# Patient Record
Sex: MALE
[De-identification: names, ages, dates, MRNs, and addresses within clinical notes are randomized per-mention and may not be internally consistent; named-entity substitution may affect disease eponyms.]

---

## 2023-06-27 ENCOUNTER — APPOINTMENT (OUTPATIENT)
Dept: NEUROSURGERY | Facility: CLINIC | Age: 83
End: 2023-06-27
Payer: MEDICARE

## 2023-06-27 PROBLEM — Z00.00 ENCOUNTER FOR PREVENTIVE HEALTH EXAMINATION: Status: ACTIVE | Noted: 2023-06-27

## 2023-06-27 PROCEDURE — 99205 OFFICE O/P NEW HI 60 MIN: CPT | Mod: 95

## 2023-06-30 ENCOUNTER — OUTPATIENT (OUTPATIENT)
Dept: OUTPATIENT SERVICES | Facility: HOSPITAL | Age: 83
LOS: 1 days | Discharge: HOME | End: 2023-06-30
Payer: MEDICARE

## 2023-07-03 RX ORDER — MIDODRINE HYDROCHLORIDE 5 MG/1
5 TABLET ORAL 3 TIMES DAILY
Refills: 0 | Status: ACTIVE | COMMUNITY

## 2023-07-03 RX ORDER — METFORMIN HYDROCHLORIDE 500 MG/1
500 TABLET, COATED ORAL DAILY
Refills: 0 | Status: ACTIVE | COMMUNITY

## 2023-07-03 RX ORDER — MIRTAZAPINE 15 MG/1
15 TABLET, FILM COATED ORAL
Refills: 0 | Status: ACTIVE | COMMUNITY

## 2023-07-03 RX ORDER — TAMSULOSIN HYDROCHLORIDE 0.4 MG/1
0.4 CAPSULE ORAL
Refills: 0 | Status: ACTIVE | COMMUNITY

## 2023-07-03 RX ORDER — DOCUSATE SODIUM 100 MG/1
100 CAPSULE, LIQUID FILLED ORAL
Refills: 0 | Status: ACTIVE | COMMUNITY

## 2023-07-03 RX ORDER — IPRATROPIUM/ALBUTEROL SULFATE 0.5-3MG/3
0.5-2.5 (3) AMPUL FOR NEBULIZATION (ML) INHALATION
Refills: 0 | Status: ACTIVE | COMMUNITY

## 2023-07-03 RX ORDER — MEMANTINE HYDROCHLORIDE 5 MG/1
5 TABLET, FILM COATED ORAL DAILY
Refills: 0 | Status: ACTIVE | COMMUNITY

## 2023-07-03 RX ORDER — FLUTICASONE PROPIONATE AND SALMETEROL XINAFOATE 115; 21 UG/1; UG/1
115-21 AEROSOL, METERED RESPIRATORY (INHALATION)
Refills: 0 | Status: ACTIVE | COMMUNITY

## 2023-07-03 RX ORDER — DULOXETINE HYDROCHLORIDE 30 MG/1
30 CAPSULE, DELAYED RELEASE ORAL
Refills: 0 | Status: ACTIVE | COMMUNITY

## 2023-07-03 RX ORDER — SIMVASTATIN 10 MG/1
10 TABLET, FILM COATED ORAL DAILY
Refills: 0 | Status: ACTIVE | COMMUNITY

## 2023-07-03 RX ORDER — PREGABALIN 150 MG/1
150 CAPSULE ORAL 3 TIMES DAILY
Refills: 0 | Status: ACTIVE | COMMUNITY

## 2023-07-03 RX ORDER — ACETAMINOPHEN 500 MG/1
500 TABLET, COATED ORAL
Refills: 0 | Status: ACTIVE | COMMUNITY

## 2023-07-05 ENCOUNTER — APPOINTMENT (OUTPATIENT)
Dept: RADIATION ONCOLOGY | Facility: CLINIC | Age: 83
End: 2023-07-05
Payer: MEDICARE

## 2023-07-05 DIAGNOSIS — Z87.891 PERSONAL HISTORY OF NICOTINE DEPENDENCE: ICD-10-CM

## 2023-07-05 DIAGNOSIS — Z86.79 PERSONAL HISTORY OF OTHER DISEASES OF THE CIRCULATORY SYSTEM: ICD-10-CM

## 2023-07-05 DIAGNOSIS — K57.90 DIVERTICULOSIS OF INTESTINE, PART UNSPECIFIED, W/OUT PERFORATION OR ABSCESS W/OUT BLEEDING: ICD-10-CM

## 2023-07-05 DIAGNOSIS — Z80.1 FAMILY HISTORY OF MALIGNANT NEOPLASM OF TRACHEA, BRONCHUS AND LUNG: ICD-10-CM

## 2023-07-05 DIAGNOSIS — Z87.19 PERSONAL HISTORY OF OTHER DISEASES OF THE DIGESTIVE SYSTEM: ICD-10-CM

## 2023-07-05 PROCEDURE — 77263 THER RADIOLOGY TX PLNG CPLX: CPT

## 2023-07-05 PROCEDURE — 99204 OFFICE O/P NEW MOD 45 MIN: CPT | Mod: 95

## 2023-07-05 RX ORDER — AMOXICILLIN 500 MG/1
CAPSULE ORAL
Refills: 0 | Status: DISCONTINUED | COMMUNITY
End: 2023-07-05

## 2023-07-05 RX ORDER — APIXABAN 5 MG/1
5 TABLET, FILM COATED ORAL
Qty: 60 | Refills: 0 | Status: ACTIVE | COMMUNITY

## 2023-07-05 RX ORDER — SENNOSIDES 8.6 MG TABLETS 8.6 MG/1
8.6 TABLET ORAL
Refills: 0 | Status: ACTIVE | COMMUNITY

## 2023-07-05 RX ORDER — SULFAMETHOXAZOLE AND TRIMETHOPRIM 800; 160 MG/1; MG/1
TABLET ORAL
Refills: 0 | Status: ACTIVE | COMMUNITY

## 2023-07-05 NOTE — VITALS
[Maximal Pain Intensity: 3/10] : 3/10 [Least Pain Intensity: 0/10] : 0/10 [70: Cares for self; unalbe to carry on normal activity or do active work.] : 70: Cares for self; unable to carry on normal activity or do active work. [ECOG Performance Status: 3 - Capable of only limited self care, confined to bed or chair more than 50% of waking hours] : Performance Status: 3 - Capable of only limited self care, confined to bed or chair more than 50% of waking hours [Date: ____________] : Patient's last distress assessment performed on [unfilled]. [Patient Refusal] : Patient refused psychosocial distress assessment [6 - Distress Level] : Distress Level: 6

## 2023-07-08 NOTE — HISTORY OF PRESENT ILLNESS
[Home] : at home, [unfilled] , at the time of the visit. 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  Whole brain 3000cgy over 10Fxs.  He presents with recurrent disease for evaluation of SRS.  \par \par Patients history dates back to 2021 when he began noticing an increased dry cough and KAUR during his workup\par CT chest 1/28/2022 completed (findings below)\par IMPRESSION:\par 1.  Emphysematous change and fibrotic changes.\par 2.  Right upper lobe endobronchial nodule. Possibility of this representing a bronchogenic malignancy should strongly be considered.\par 3.  Multiple lung nodules. Differential diagnosis of metastases should be considered.\par 4.  Suggestion of cirrhosis and presence of gallstones.\par 5.  [place] calcification at the tail of the pancreas. This may be on the basis of pancreatitis.\par 6.  Right adrenal gland 1 cm adenoma. If the patient has no clinical or laboratory symptoms associated with a malfunctioning adenoma, no follow-up is needed.\par 7.  Right renal cysts. 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 Sampled lymph nodes negative.  IMRT Right lung and mediastinum : 3/30/22-4/19/22 4500cGy over 30Fxs\par \par 8/5/22 PET interpreted as demonstrated reduction in previously right upper lobe mass and lung changes consistent with radiation treatment.  MRI brain 9/2/2022 w/o evidence of intracranial disease.  \par \par MRI brain 12/9/2022 showing several enhancing lesions (findings most compatible with metastasis in setting of primary lung cancer\par Treatment dates: 1/3/2023-1/17/2023.  Whole brain 3000cgy over 10Fxs.  \par \par He is receiving immunotherapy Nivolumab (since 1/2023) under the care of Dr. Mateo Peres \par Continues to experience SB d/t pleural effusion requiring weekly drainage utilizing O2\par \par MRI brain w w/o contrast 6/17/2023.  Comparison: MRI brain w w/o contrast dated 3/20/23\par Impression/Findings: 2.0x1.3x2.2cm right occipital mass which is markedly enlarged compared to prior exam\par Enhancing mass in the left occipital lobe decreased in size now demonstrating fiant linear enhancement\par New enhancing mass lesion in the right frontal lobe measuring 1.1cm. Interval enlargement of left frontal mass 1.4cm\par SWI demonstrates low signal in the left cerebellum, bilateral occipital lobes and right frontal lobe compatible with old hemorrhage related metastasis.  The ventricles, cisterns and sulci are within normal limits.\par \par CT C/A/P  w contrast 6/7/2023.\par Impression:  Interval placement of right chest tunneled pleural catheter. Moderate volume right pleural effusion decreased/  \par New increased opacity posterior right lower lobe which may represent atelectasis although active airspace diseases/pneumonia can be considered in  the proper clinical setting. Increased right hilar subcarinal adenopathy. Left hilar adenopathy unchanged.\par Enlarged gastrohepatic lymph node, decreased in size.  Unchanged small indeterminate right adrenal  gland nodule.\par \par Today is wishes to discuss the role of further RT in his care.  He utilize O2 1.5L consistently Continues with a pleura cath drained 2x/week which gives improvement in breathing.  Reports his appetite is good.  Endorses decreased vision/diplopia/gait disturbance which has been worsening over the past 3 weeks. Utilizes a walker.   "I get tired and winded" able to walk ~20 feet then needs to sit.  Denies focal weakness ( previous shingles which resulted in LEFT arm weakness resolved with PT)\par

## 2023-07-08 NOTE — REVIEW OF SYSTEMS
[Fatigue] : fatigue [SOB on Exertion] : shortness of breath during exertion [Constipation] : constipation [Joint Pain] : joint pain [Disturbance Of Gait] : gait disturbance [Negative] : Allergic/Immunologic [Constipation: Grade 1 - Occasional or intermittent symptoms; occasional use of stool softeners, laxatives, dietary modification, or enema] : Constipation: Grade 1 - Occasional or intermittent symptoms; occasional use of stool softeners, laxatives, dietary modification, or enema [Fatigue: Grade 2 - Fatigue not relieved by rest; limiting instrumental ADL] : Fatigue: Grade 2 - Fatigue not relieved by rest; limiting instrumental ADL [Dizziness: Grade 0] : Dizziness: Grade 0  [Headache: Grade 0] : Headache: Grade 0 [Dyspnea: Grade 2 - Shortness of breath with minimal exertion; limiting instrumental ADL] : Dyspnea: Grade 2 - Shortness of breath with minimal exertion; limiting instrumental ADL [Dizziness] : no dizziness [Insomnia] : no insomnia [FreeTextEntry4] : MARKOS [FreeTextEntry6] : O2 [de-identified] : denies headaches [FreeTextEntry3] : uses O2 [FreeTextEntry2] : after walking 20 feet

## 2023-07-09 NOTE — DATA REVIEWED
[de-identified] : MRI brain w w/o contrast 6/17/2023. Comparison: MRI brain w w/o contrast dated 3/20/23\par Impression/Findings: 2.0x1.3x2.2cm right occipital mass which is markedly enlarged compared to prior exam\par Enhancing mass in the left occipital lobe decreased in size now demonstrating fiant linear enhancement\par New enhancing mass lesion in the right frontal lobe measuring 1.1cm. Interval enlargement of left frontal mass 1.4cm\par SWI demonstrates low signal in the left cerebellum, bilateral occipital lobes and right frontal lobe compatible with old hemorrhage related metastasis. The ventricles, cisterns and sulci are within normal limits.

## 2023-07-09 NOTE — PHYSICAL EXAM
[General Appearance - Alert] : alert [General Appearance - In No Acute Distress] : in no acute distress [Oriented To Time, Place, And Person] : oriented to person, place, and time [No Visual Abnormalities] : no visible abnormalities

## 2023-07-09 NOTE — PLAN
[FreeTextEntry1] :  82 yo male with small cell lung cancer s/p whole brain radiation in approximately January 2023 who now presents with new brain metastases on MRI. Risks, benefits, alternatives to SRS discussed. Risks including but not limited to seizure, cerebral edema, radionecrosis, treatment failure discussed. Patient and daughter understand and wish to move forward with Gamma Knife radiosurgery treatment.

## 2023-07-09 NOTE — HISTORY OF PRESENT ILLNESS
[FreeTextEntry1] : brain metastases [de-identified] : This visit was provided via telehealth using real-time 2-way audio visual technology. The patient, MISBAH COYLE, was located at home, [address] [place] [address] , at the time of the visit. \par The provider, Antolin Chilel, was located at the medical office located in [facility] at the time of the visit. The patient, MISBAH COYLE and Provider participated in the telehealth encounter. Family member and daughter Baldo also participated. \par Consent for telehealth services was given\par This is an 84 y/o M has completed radiation therapy for SCLC (IMRT Right lung and mediastinum : 3/30/22-4/19/22 4500cGy over 30Fxs) with brain metastasis Treatment dates: 1/3/2023-1/17/2023. Whole brain 3000cgy over 10Fxs. He presents with recurrent disease for evaluation of SRS. \par \par Patients history dates back to 2021 when he began noticing an increased dry cough and KAUR during his workup\par CT chest 1/28/2022 completed (findings below)\par IMPRESSION:\par 1. Emphysematous change and fibrotic changes.\par 2. Right upper lobe endobronchial nodule. Possibility of this representing a bronchogenic malignancy should strongly be considered.\par 3. Multiple lung nodules. Differential diagnosis of metastases should be considered.\par 4. Suggestion of cirrhosis and presence of gallstones.\par 5. [place] calcification at the tail of the pancreas. This may be on the basis of pancreatitis.\par 6. Right adrenal gland 1 cm adenoma. If the patient has no clinical or laboratory symptoms associated with a malfunctioning adenoma, no follow-up is needed.\par 7. Right renal cysts. 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 Sampled lymph nodes negative. IMRT Right lung and mediastinum : 3/30/22-4/19/22 4500cGy over 30Fxs\par \par 8/5/22 PET interpreted as demonstrated reduction in previously right upper lobe mass and lung changes consistent with radiation treatment. MRI brain 9/2/2022 w/o evidence of intracranial disease. \par \par MRI brain 12/9/2022 showing several enhancing lesions (findings most compatible with metastasis in setting of primary lung cancer\par Treatment dates: 1/3/2023-1/17/2023. Whole brain 3000cgy over 10Fxs. \par \par He is receiving immunotherapy Nivolumab (since 1/2023) under the care of Dr. Mateo Peres \par Continues to experience SB d/t pleural effusion requiring weekly drainage utilizing O2\par \par \par \par CT C/A/P w contrast 6/7/2023.\par Impression: Interval placement of right chest tunneled pleural catheter. Moderate volume right pleural effusion decreased/ \par New increased opacity posterior right lower lobe which may represent atelectasis although active airspace diseases/pneumonia can be considered in the proper clinical setting. Increased right hilar subcarinal adenopathy. Left hilar adenopathy unchanged.\par Enlarged gastrohepatic lymph node, decreased in size. Unchanged small indeterminate right adrenal gland nodule.\par \par Today is wishes to discuss the role of further RT in his care. He utilize O2 1.5L consistently Continues with a pleura cath drained 2x/week which gives improvement in breathing. Reports his appetite is good. Endorses decreased vision/diplopia/gait disturbance which has been worsening over the past 3 weeks. Utilizes a walker. "I get tired and winded" able to walk ~20 feet then needs to sit. Denies focal weakness ( previous shingles which resulted in LEFT arm weakness resolved with PT)\par \par  \par \par

## 2023-07-19 ENCOUNTER — APPOINTMENT (OUTPATIENT)
Dept: NEUROSURGERY | Facility: AMBULATORY SURGERY CENTER | Age: 83
End: 2023-07-19
Payer: MEDICARE

## 2023-07-19 ENCOUNTER — OUTPATIENT (OUTPATIENT)
Dept: OUTPATIENT SERVICES | Facility: HOSPITAL | Age: 83
LOS: 1 days | End: 2023-07-19
Payer: MEDICARE

## 2023-07-19 ENCOUNTER — APPOINTMENT (OUTPATIENT)
Dept: MRI IMAGING | Facility: IMAGING CENTER | Age: 83
End: 2023-07-19

## 2023-07-19 DIAGNOSIS — C79.31 SECONDARY MALIGNANT NEOPLASM OF BRAIN: ICD-10-CM

## 2023-07-19 PROCEDURE — 77295 3-D RADIOTHERAPY PLAN: CPT | Mod: 26

## 2023-07-19 PROCEDURE — A9585: CPT

## 2023-07-19 PROCEDURE — 77300 RADIATION THERAPY DOSE PLAN: CPT | Mod: 26

## 2023-07-19 PROCEDURE — 77290 THER RAD SIMULAJ FIELD CPLX: CPT | Mod: 26

## 2023-07-19 PROCEDURE — 77435 SBRT MANAGEMENT: CPT

## 2023-07-19 PROCEDURE — 61798 SRS CRANIAL LESION COMPLEX: CPT

## 2023-07-19 PROCEDURE — 61797 SRS CRAN LES SIMPLE ADDL: CPT

## 2023-07-19 PROCEDURE — 77334 RADIATION TREATMENT AID(S): CPT | Mod: 26

## 2023-07-19 PROCEDURE — 76498 UNLISTED MR PROCEDURE: CPT

## 2023-07-19 RX ORDER — DEXAMETHASONE 2 MG/1
2 TABLET ORAL
Qty: 28 | Refills: 0 | Status: ACTIVE | COMMUNITY
Start: 2023-07-19 | End: 1900-01-01

## 2023-08-15 ENCOUNTER — APPOINTMENT (OUTPATIENT)
Dept: NEUROSURGERY | Facility: CLINIC | Age: 83
End: 2023-08-15
Payer: MEDICARE

## 2023-08-15 PROCEDURE — 99024 POSTOP FOLLOW-UP VISIT: CPT

## 2023-08-15 NOTE — ASSESSMENT
[FreeTextEntry1] : Doing well s/p GKRS one month ago Recommend MRI brain with and without contrast in 3 months (October 2023), which he will obtain at [facility] Discussed plan with [facility] oncology team - Dr. Peres and Dr. Day After MRI complete, return to the office to review imaging with Dr. Chilel  Patient and patient's family verbalize agreement and understanding with plan of care. I, Dr. Chilel, personally performed the evaluation and management (E/M) services for this established patient who presents today with (a) new problem(s)/exacerbation of (an) existing condition(s).  That E/M includes conducting the examination, assessing all new/exacerbated conditions, and establishing a new plan of care.  Today, my ACP, Rosa Prieto, was here to observe my evaluation and management services for this new problem/exacerbated condition to be followed going forward.

## 2023-08-15 NOTE — REASON FOR VISIT
[Follow-Up: _____] : a [unfilled] follow-up visit [Home] : at home, [unfilled] , at the time of the visit. [Medical Office: ([facility])___] : at the medical office located in  [Family Member] : family member [Patient] : the patient [FreeTextEntry1] : s/p GKRS to brain metastases on 7/19/23

## 2023-08-15 NOTE — HISTORY OF PRESENT ILLNESS
[FreeTextEntry1] : brain metastases [de-identified] : This visit was provided via telehealth using real-time 2-way audio visual technology. The patient, MISBAH COYLE, was located at home, [address] [place] [address] , at the time of the visit.  The provider, Antolin Chilel, was located at the medical office located in [facility] at the time of the visit. The patient, MISBAH COYLE and Provider participated in the telehealth encounter. Family member and daughter Baldo also participated.  Consent for telehealth services was given This is an 82 y/o M has completed radiation therapy for SCLC (IMRT Right lung and mediastinum : 3/30/22-4/19/22 4500cGy over 30Fxs) with brain metastasis Treatment dates: 1/3/2023-1/17/2023. Whole brain 3000cgy over 10Fxs. He presents with recurrent disease for evaluation of SRS.   Patients history dates back to 2021 when he began noticing an increased dry cough and KAUR during his workup CT chest 1/28/2022 completed (findings below) IMPRESSION: 1. Emphysematous change and fibrotic changes. 2. Right upper lobe endobronchial nodule. Possibility of this representing a bronchogenic malignancy should strongly be considered. 3. Multiple lung nodules. Differential diagnosis of metastases should be considered. 4. Suggestion of cirrhosis and presence of gallstones. 5. [place] calcification at the tail of the pancreas. This may be on the basis of pancreatitis. 6. Right adrenal gland 1 cm adenoma. If the patient has no clinical or laboratory symptoms associated with a malfunctioning adenoma, no follow-up is needed. 7. Right renal cysts. No follow-up needed.  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 Sampled lymph nodes negative. IMRT Right lung and mediastinum : 3/30/22-4/19/22 4500cGy over 30Fxs  8/5/22 PET interpreted as demonstrated reduction in previously right upper lobe mass and lung changes consistent with radiation treatment. MRI brain 9/2/2022 w/o evidence of intracranial disease.   MRI brain 12/9/2022 showing several enhancing lesions (findings most compatible with metastasis in setting of primary lung cancer Treatment dates: 1/3/2023-1/17/2023. Whole brain 3000cgy over 10Fxs.   He is receiving immunotherapy Nivolumab (since 1/2023) under the care of Dr. Mateo Peres  Continues to experience SB d/t pleural effusion requiring weekly drainage utilizing O2  CT C/A/P w contrast 6/7/2023. Impression: Interval placement of right chest tunneled pleural catheter. Moderate volume right pleural effusion decreased/  New increased opacity posterior right lower lobe which may represent atelectasis although active airspace diseases/pneumonia can be considered in the proper clinical setting. Increased right hilar subcarinal adenopathy. Left hilar adenopathy unchanged. Enlarged gastrohepatic lymph node, decreased in size. Unchanged small indeterminate right adrenal gland nodule.  He utilize O2 1.5L consistently Continues with a pleura cath drained 2x/week which gives improvement in breathing. Reports his appetite is good. Endorses decreased vision/diplopia/gait disturbance which has been worsening over the past 3 weeks. Utilizes a walker. "I get tired and winded" able to walk ~20 feet then needs to sit. Denies focal weakness ( previous shingles which resulted in LEFT arm weakness resolved with PT)  Returns today for one month follow up. He is with his daughter today. He states he is doing well. His daughter reports increased confusion the past few weeks.  The patient denies headaches. His daughter states he recently stopped his memantine and feels that it potentially may need to be restarted. He is being treated with immunotherapy once a month by Dr. Peres.  Medical Oncology: Dr. Mateo Peres Radiation Oncology: Dr. Peterson Day

## 2023-12-19 ENCOUNTER — APPOINTMENT (OUTPATIENT)
Dept: NEUROSURGERY | Facility: CLINIC | Age: 83
End: 2023-12-19
Payer: MEDICARE

## 2023-12-19 ENCOUNTER — OUTPATIENT (OUTPATIENT)
Dept: OUTPATIENT SERVICES | Facility: HOSPITAL | Age: 83
LOS: 1 days | Discharge: HOME | End: 2023-12-19
Payer: MEDICARE

## 2023-12-19 PROCEDURE — 99214 OFFICE O/P EST MOD 30 MIN: CPT | Mod: 95

## 2023-12-19 NOTE — HISTORY OF PRESENT ILLNESS
[FreeTextEntry1] : brain metastases [de-identified] : This is an 82 y/o M has completed radiation therapy for SCLC (IMRT Right lung and mediastinum : 3/30/22-4/19/22 4500cGy over 30Fxs) with brain metastasis Treatment dates: 1/3/2023-1/17/2023. Whole brain 3000cgy over 10Fxs. He presents with recurrent disease for evaluation of SRS.   Patients history dates back to 2021 when he began noticing an increased dry cough and KAUR during his workup CT chest 1/28/2022 completed (findings below) IMPRESSION: 1. Emphysematous change and fibrotic changes. 2. Right upper lobe endobronchial nodule. Possibility of this representing a bronchogenic malignancy should strongly be considered. 3. Multiple lung nodules. Differential diagnosis of metastases should be considered. 4. Suggestion of cirrhosis and presence of gallstones. 5. [place] calcification at the tail of the pancreas. This may be on the basis of pancreatitis. 6. Right adrenal gland 1 cm adenoma. If the patient has no clinical or laboratory symptoms associated with a malfunctioning adenoma, no follow-up is needed. 7. Right renal cysts. No follow-up needed.  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 Sampled lymph nodes negative. IMRT Right lung and mediastinum : 3/30/22-4/19/22 4500cGy over 30Fxs  8/5/22 PET interpreted as demonstrated reduction in previously right upper lobe mass and lung changes consistent with radiation treatment. MRI brain 9/2/2022 w/o evidence of intracranial disease.   MRI brain 12/9/2022 showing several enhancing lesions (findings most compatible with metastasis in setting of primary lung cancer Treatment dates: 1/3/2023-1/17/2023. Whole brain 3000cgy over 10Fxs.   He is receiving immunotherapy Nivolumab (since 1/2023) under the care of Dr. Mateo Peres  Continues to experience SB d/t pleural effusion requiring weekly drainage utilizing O2  CT C/A/P w contrast 6/7/2023. Impression: Interval placement of right chest tunneled pleural catheter. Moderate volume right pleural effusion decreased/  New increased opacity posterior right lower lobe which may represent atelectasis although active airspace diseases/pneumonia can be considered in the proper clinical setting. Increased right hilar subcarinal adenopathy. Left hilar adenopathy unchanged. Enlarged gastrohepatic lymph node, decreased in size. Unchanged small indeterminate right adrenal gland nodule.  He utilize O2 1.5L consistently Continues with a pleura cath drained 2x/week which gives improvement in breathing. Reports his appetite is good. Endorses decreased vision/diplopia/gait disturbance which has been worsening over the past 3 weeks. Utilizes a walker. "I get tired and winded" able to walk ~20 feet then needs to sit. Denies focal weakness ( previous shingles which resulted in LEFT arm weakness resolved with PT)  He underwent GKRS to left frontal, right frontal and right occipital regions on 7/19/23.  He recently had new MRI Brain which demonstrated new metastases. Returns today to discuss GKRS to new metastases.  Denies new neurological symptoms.  Medical Oncology: Dr. Mateo Peres Radiation Oncology: Dr. Peterson Day

## 2023-12-19 NOTE — ASSESSMENT
[FreeTextEntry1] : MRI brain with and without contrast done from 11/2023, demonstrates new brain metastases in cerebellar vermis, left posterior temporal, and left parietal corona radiata. Previously treated metastases demonstrated good treatment response. Recommend Gamma Knife Radiosurgery, mask based treatment. Risks, alternatives and benefits to Gamma Knife Radiosurgery discussed. Risks include but are not limited to cerebral edema, radionecrosis, seizures, continued tumor growth. Patient understands and wishes to proceed.  Explained procedure to patient in detail including head frame placement, new MRI the day of procedure, radiation treatment, and post-procedure care.  Radiation treatment will take place at [address] [place] [address]. Directions and contact information provided to patient. Education packet regarding Gamma Knife Radiosurgery provided. Multiple questions answered to patient's satisfaction.   Patient and patient's family verbalize agreement and understanding with plan of care. I, Dr. Chilel, personally performed the evaluation and management (E/M) services for this established patient who presents today with (a) new problem(s)/exacerbation of (an) existing condition(s).  That E/M includes conducting the examination, assessing all new/exacerbated conditions, and establishing a new plan of care.  Today, my ACP, Rosa Prieto, was here to observe my evaluation and management services for this new problem/exacerbated condition to be followed going forward.

## 2023-12-19 NOTE — REASON FOR VISIT
[Follow-Up: _____] : a [unfilled] follow-up visit [Home] : at home, [unfilled] , at the time of the visit. [Medical Office: ([facility])___] : at the medical office located in  [Family Member] : family member [Patient] : the patient [FreeTextEntry1] : s/p GKRS to brain metastases on 7/19/23

## 2024-01-02 ENCOUNTER — APPOINTMENT (OUTPATIENT)
Dept: RADIATION ONCOLOGY | Facility: CLINIC | Age: 84
End: 2024-01-02
Payer: MEDICARE

## 2024-01-02 PROCEDURE — 99024 POSTOP FOLLOW-UP VISIT: CPT

## 2024-01-03 DIAGNOSIS — C79.31 SECONDARY MALIGNANT NEOPLASM OF BRAIN: ICD-10-CM

## 2024-01-18 ENCOUNTER — APPOINTMENT (OUTPATIENT)
Dept: MRI IMAGING | Facility: IMAGING CENTER | Age: 84
End: 2024-01-18

## 2024-01-22 ENCOUNTER — APPOINTMENT (OUTPATIENT)
Dept: NEUROSURGERY | Facility: AMBULATORY SURGERY CENTER | Age: 84
End: 2024-01-22
Payer: MEDICARE

## 2024-01-22 ENCOUNTER — APPOINTMENT (OUTPATIENT)
Dept: MRI IMAGING | Facility: IMAGING CENTER | Age: 84
End: 2024-01-22

## 2024-01-22 ENCOUNTER — OUTPATIENT (OUTPATIENT)
Dept: OUTPATIENT SERVICES | Facility: HOSPITAL | Age: 84
LOS: 1 days | End: 2024-01-22
Payer: MEDICARE

## 2024-01-22 DIAGNOSIS — C79.31 SECONDARY MALIGNANT NEOPLASM OF BRAIN: ICD-10-CM

## 2024-01-22 DIAGNOSIS — Z00.8 ENCOUNTER FOR OTHER GENERAL EXAMINATION: ICD-10-CM

## 2024-01-22 PROCEDURE — 61797 SRS CRAN LES SIMPLE ADDL: CPT

## 2024-01-22 PROCEDURE — 61796 SRS CRANIAL LESION SIMPLE: CPT

## 2024-01-22 PROCEDURE — 77295 3-D RADIOTHERAPY PLAN: CPT | Mod: 26

## 2024-01-22 PROCEDURE — 77432 STEREOTACTIC RADIATION TRMT: CPT

## 2024-01-22 PROCEDURE — 77290 THER RAD SIMULAJ FIELD CPLX: CPT | Mod: 26

## 2024-01-22 PROCEDURE — 77334 RADIATION TREATMENT AID(S): CPT | Mod: 26

## 2024-01-22 PROCEDURE — 77263 THER RADIOLOGY TX PLNG CPLX: CPT

## 2024-01-22 PROCEDURE — 77300 RADIATION THERAPY DOSE PLAN: CPT | Mod: 26

## 2024-01-22 PROCEDURE — 76498 UNLISTED MR PROCEDURE: CPT

## 2024-01-22 RX ORDER — ACETAMINOPHEN 325 MG/1
325 TABLET ORAL
Qty: 0 | Refills: 0 | Status: COMPLETED | OUTPATIENT
Start: 2024-01-22

## 2024-01-22 NOTE — PROCEDURE
[FreeTextEntry1] : Gamma Knife radiosurgery [FreeTextEntry2] : Brain metastasis [FreeTextEntry3] : Gamma Knife Operative Note Date of Procedure: 2024 Patient: Stone Crowe : 1940  Neurosurgeon: Antolin Chilel Radiation Oncologist: Davidson Echavarria  Preop Diagnosis: Brain metastases Postop Diagnosis: Same  Procedure:  1. Gamma Knife Radiosurgery to brain metastases in the A. left parietal, B. left temporal, and C. left cerebellar vermis regions 2. Use of [place] ICON mask Indication:  This is an 83 year old man with a history  of metastatic small cell lung cancer to the brain. He previously underwent whole brain radiation and gamma knife radiosurgery. He  now has three new focal areas of enhancing brain disease. Radiosurgery is therefore indicated to effect local brain tumor growth control. Detail:  The patient was fitted with a custom molded ICON face mask for head stabilization during treatment. Stereotactic brain MRI was obtained showing  brain metastases in the A. left parietal, B. left temporal, and C. left cerebellar vermis regions. These tumors were contoured in GammaPlan software. Prescriptions doses were 18 Gy to parietal an cerebellar tumors, and 16 Gy to the temporal tumor. Cone beam CT scan was performed and fused to the stereotactic MRI. The patient was treated with these parameters without incident then discharged home.    Antolin Chilel M.D.

## 2024-02-06 NOTE — HISTORY OF PRESENT ILLNESS
[FreeTextEntry1] : This is a 83 year old male who presented in 2022 with dry cough and was found to have small cell carcinoma of the right upper lung. This was proven on biopsy in March of 2022 it went on to have 45 gy in 30 fractions BID completed in April of 2022. December of 22 he was found to have multiple enhancing lesions and received whole brain radiation 30 Mares in 10 fractions. He went on to receive nivolumab under the care of Mateo Peres. In June of 2023 he was found to have intracranial progression On MRI. At that time he also had progression of intrathoracic adenopathy. He went on to receive SRS for the new CNS lesions - left frontal, right frontal and right occipital regions on 7/19/23.  His most recent MRI shows new metastases in the cerebellar vermus left posterior temporal and left parietal corona radiata. He discussed another course of gamma knife with doctor Chilel  Clinically, he has had deteriorating performance status.  He has had increasing confusion, which seems to wax and wane.  He is receiving immunotherapy under the care of Dr. Peres.  He is on oxygen.  He requires weekly drainage of pleural effusions.  He has shortness of breath with exertion.  He is able to perform his daily tasks of living.  He has had a recent PET scan - results pending.  He denies headache, change in vision, or focal change in strength.

## 2024-02-27 ENCOUNTER — APPOINTMENT (OUTPATIENT)
Dept: NEUROSURGERY | Facility: CLINIC | Age: 84
End: 2024-02-27
Payer: MEDICARE

## 2024-02-27 DIAGNOSIS — G93.89 OTHER SPECIFIED DISORDERS OF BRAIN: ICD-10-CM

## 2024-02-27 DIAGNOSIS — Z92.3 PERSONAL HISTORY OF IRRADIATION: ICD-10-CM

## 2024-02-27 PROCEDURE — 99024 POSTOP FOLLOW-UP VISIT: CPT

## 2024-02-28 PROBLEM — Z92.3 STATUS POST GAMMA KNIFE TREATMENT: Status: ACTIVE | Noted: 2023-08-15

## 2024-02-28 PROBLEM — G93.89 BRAIN MASS: Status: ACTIVE | Noted: 2023-06-28

## 2024-02-28 NOTE — HISTORY OF PRESENT ILLNESS
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 Whole brain 3000cgy over 10Fxs. He presents with recurrent disease for evaluation of SRS.   Patients history dates back to 2021 when he began noticing an increased dry cough and KAUR during his workup CT chest 1/28/2022 completed (findings below) IMPRESSION: 1. Emphysematous change and fibrotic changes. 2. Right upper lobe endobronchial nodule. Possibility of this representing a bronchogenic malignancy should strongly be considered. 3. Multiple lung nodules. Differential diagnosis of metastases should be considered. 4. Suggestion of cirrhosis and presence of gallstones. 5. [place] calcification at the tail of the pancreas. This may be on the basis of pancreatitis. 6. Right adrenal gland 1 cm adenoma. If the patient has no clinical or laboratory symptoms associated with a malfunctioning adenoma, no follow-up is needed. 7. Right renal cysts. No follow-up needed.  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 Sampled lymph nodes negative. IMRT Right lung and mediastinum : 3/30/22-4/19/22 4500cGy over 30Fxs  8/5/22 PET interpreted as demonstrated reduction in previously right upper lobe mass and lung changes consistent with radiation treatment. MRI brain 9/2/2022 w/o evidence of intracranial disease.   MRI brain 12/9/2022 showing several enhancing lesions (findings most compatible with metastasis in setting of primary lung cancer Treatment dates: 1/3/2023-1/17/2023. Whole brain 3000cgy over 10Fxs.   He is receiving immunotherapy Nivolumab (since 1/2023) under the care of Dr. Mateo Peres  Continues to experience SB d/t pleural effusion requiring weekly drainage utilizing O2  CT C/A/P w contrast 6/7/2023. Impression: Interval placement of right chest tunneled pleural catheter. Moderate volume right pleural effusion decreased/  New increased opacity posterior right lower lobe which may represent atelectasis although active airspace diseases/pneumonia can be considered in the proper clinical setting. Increased right hilar subcarinal adenopathy. Left hilar adenopathy unchanged. Enlarged gastrohepatic lymph node, decreased in size. Unchanged small indeterminate right adrenal gland nodule.  He utilize O2 1.5L consistently Continues with a pleura cath drained 2x/week which gives improvement in breathing. Reports his appetite is good. Endorses decreased vision/diplopia/gait disturbance which has been worsening over the past 3 weeks. Utilizes a walker. "I get tired and winded" able to walk ~20 feet then needs to sit. Denies focal weakness ( previous shingles which resulted in LEFT arm weakness resolved with PT)  He underwent GKRS to left frontal, right frontal and right occipital regions on 7/19/23.  He had new MRI Brain from 1/2023 which demonstrated new metastases. Denies new neurological symptoms.  He underwent GKRS to left parietal, left temporal and left cerebellar vermis on 1/22/24. Doing well. Daughter reports some worsening memory loss.  Medical Oncology: Dr. Mateo Peres Radiation Oncology: Dr. Peterson Day

## 2024-02-28 NOTE — REASON FOR VISIT
[Follow-Up: _____] : a [unfilled] follow-up visit [Home] : at home, [unfilled] , at the time of the visit. [Medical Office: ([facility])___] : at the medical office located in  [Family Member] : family member [Patient] : the patient [FreeTextEntry1] : s/p GKRS to left parietal, left temporal and left cerebellar vermis on 1/22/24.

## 2024-02-28 NOTE — ASSESSMENT
[FreeTextEntry1] : Doing well s/p GKRS Will need MRI brain with and without contrast in 3 months (April 2024). Discussed with oncology team at [facility] who will order. After MRI complete, RTO to review with Dr. Chilel  Patient and patient's family verbalize agreement and understanding with plan of care.  I, Dr. Chilel, personally performed the evaluation and management (E/M) services for this established patient who presents today with (a) new problem(s)/exacerbation of (an) existing condition(s).  That E/M includes conducting the examination, assessing all new/exacerbated conditions, and establishing a new plan of care.  Today, my ACP, Rosa Prieto, was here to observe my evaluation and management services for this new problem/exacerbated condition to be followed going forward.

## 2024-03-18 ENCOUNTER — APPOINTMENT (OUTPATIENT)
Dept: MRI IMAGING | Facility: IMAGING CENTER | Age: 84
End: 2024-03-18

## 2024-03-20 ENCOUNTER — APPOINTMENT (OUTPATIENT)
Dept: RADIATION ONCOLOGY | Facility: CLINIC | Age: 84
End: 2024-03-20
Payer: MEDICARE

## 2024-03-20 DIAGNOSIS — C79.31 SECONDARY MALIGNANT NEOPLASM OF BRAIN: ICD-10-CM

## 2024-03-20 PROCEDURE — 99024 POSTOP FOLLOW-UP VISIT: CPT

## 2024-03-20 RX ORDER — SITAGLIPTIN 100 MG/1
100 TABLET, FILM COATED ORAL
Refills: 0 | Status: ACTIVE | COMMUNITY

## 2024-03-20 RX ORDER — PREDNISONE 5 MG
5 TABLET, DOSE PACK ORAL
Refills: 0 | Status: ACTIVE | COMMUNITY

## 2024-03-20 NOTE — REVIEW OF SYSTEMS
[Fatigue] : fatigue [Negative] : Endocrine [Shortness Of Breath] : no shortness of breath [Cough] : no cough [FreeTextEntry2] : short term memory issues [FreeTextEntry3] : decreased vision [de-identified] : short term memory loss

## 2024-03-20 NOTE — HISTORY OF PRESENT ILLNESS
[Home] : at home, [unfilled] , at the time of the visit. [Medical Office: ([facility])___] : at the medical office located in  [Verbal consent obtained from patient] : the patient, [unfilled] [FreeTextEntry1] : RT hx: IMRT Right lung and mediastinum 45GY over 30Fxs  3/30/22-4/19/22  WBRT 30Gy over 10 Fxs 1/3-1/17/2023 GKRS RIGHT occipital, RIGHT frontal, LEFT frontal 18GY over 1 Fx 7/19/2023. 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 This was proven on biopsy in March of 2022 it went on to have 45 gy in 30 fractions BID completed in April of 2022. December of 22 he was found to have multiple enhancing lesions and received whole brain radiation 30 Mares in 10 fractions. He went on to receive nivolumab under the care of Mateo Peres. In June of 2023 he was found to have intracranial progression On MRI. At that time he also had progression of intrathoracic adenopathy. He went on to receive SRS for the new CNS lesions - left frontal, right frontal and right occipital regions on 7/19/23.  His most recent MRI shows new metastases in the cerebellar vermus left posterior temporal and left parietal corona radiata. He discussed another course of gamma knife with doctor Getachew  Clinically, he has had deteriorating performance status.  He has had increasing confusion, which seems to wax and wane.  He is receiving immunotherapy under the care of Dr. Peres.  He is on oxygen.  He requires weekly drainage of pleural effusions.  He has shortness of breath with exertion.  He is able to perform his daily tasks of living.  He has had a recent PET scan - results pending.  He denies headache, change in vision, or focal change in strength.     Visit dated 3/20/2024 Patient returns for progress check he recently complete GKRS to LEFT parietal, LEFT occipital, LEFT cerebellum 18GY over 1Fx 1/22/2024. He notes fatigue and a slow decline in vision over time. He has some issues with short term memory which has been ongoing since doing WBRT. Denies N/V, HA/unilateral extremity weakness/memory changes/gait disturbance/bowel/bladder dysfunction or other neurologic symptoms. No issues with speech or comprehension.  Continues to follow with Dr. Peres and will be seeing him on 4/4. They want to perform the MRI brain at [facility]. He had some side effects from the immunotherapy, pneumonitis. He was started on prednisone 5mg. He is not on any systemic therapy at this time. Reported to have a recent CT chest which showed resolution of pleural effusion back from his hospitalization in January. Drain with no output and lungs reportedly have cleared up.